# Patient Record
Sex: FEMALE
[De-identification: names, ages, dates, MRNs, and addresses within clinical notes are randomized per-mention and may not be internally consistent; named-entity substitution may affect disease eponyms.]

---

## 2021-11-17 ENCOUNTER — APPOINTMENT (OUTPATIENT)
Dept: ORTHOPEDIC SURGERY | Facility: CLINIC | Age: 61
End: 2021-11-17
Payer: COMMERCIAL

## 2021-11-17 VITALS
HEART RATE: 71 BPM | HEIGHT: 63 IN | DIASTOLIC BLOOD PRESSURE: 83 MMHG | SYSTOLIC BLOOD PRESSURE: 122 MMHG | BODY MASS INDEX: 26.58 KG/M2 | WEIGHT: 150 LBS

## 2021-11-17 DIAGNOSIS — M54.50 LOW BACK PAIN, UNSPECIFIED: ICD-10-CM

## 2021-11-17 DIAGNOSIS — M51.36 OTHER INTERVERTEBRAL DISC DEGENERATION, LUMBAR REGION: ICD-10-CM

## 2021-11-17 DIAGNOSIS — M79.10 MYALGIA, UNSPECIFIED SITE: ICD-10-CM

## 2021-11-17 DIAGNOSIS — M60.9 MYOSITIS, UNSPECIFIED: ICD-10-CM

## 2021-11-17 PROBLEM — Z00.00 ENCOUNTER FOR PREVENTIVE HEALTH EXAMINATION: Status: ACTIVE | Noted: 2021-11-17

## 2021-11-17 PROCEDURE — 20553 NJX 1/MLT TRIGGER POINTS 3/>: CPT

## 2021-11-17 PROCEDURE — 99204 OFFICE O/P NEW MOD 45 MIN: CPT | Mod: 25

## 2021-11-17 PROCEDURE — 72100 X-RAY EXAM L-S SPINE 2/3 VWS: CPT

## 2021-11-17 RX ORDER — DICLOFENAC SODIUM 75 MG/1
75 TABLET, DELAYED RELEASE ORAL
Qty: 30 | Refills: 1 | Status: ACTIVE | COMMUNITY
Start: 2021-11-17 | End: 1900-01-01

## 2021-12-11 ENCOUNTER — APPOINTMENT (OUTPATIENT)
Dept: MRI IMAGING | Facility: CLINIC | Age: 61
End: 2021-12-11

## 2021-12-11 ENCOUNTER — OUTPATIENT (OUTPATIENT)
Dept: OUTPATIENT SERVICES | Facility: HOSPITAL | Age: 61
LOS: 1 days | End: 2021-12-11
Payer: COMMERCIAL

## 2021-12-11 DIAGNOSIS — M51.36 OTHER INTERVERTEBRAL DISC DEGENERATION, LUMBAR REGION: ICD-10-CM

## 2021-12-11 PROCEDURE — 72148 MRI LUMBAR SPINE W/O DYE: CPT

## 2021-12-11 PROCEDURE — 72148 MRI LUMBAR SPINE W/O DYE: CPT | Mod: 26

## 2021-12-15 ENCOUNTER — APPOINTMENT (OUTPATIENT)
Dept: NEUROSURGERY | Facility: CLINIC | Age: 61
End: 2021-12-15
Payer: COMMERCIAL

## 2021-12-15 VITALS
TEMPERATURE: 97.9 F | DIASTOLIC BLOOD PRESSURE: 87 MMHG | WEIGHT: 150 LBS | SYSTOLIC BLOOD PRESSURE: 144 MMHG | OXYGEN SATURATION: 98 % | BODY MASS INDEX: 26.58 KG/M2 | HEART RATE: 74 BPM | HEIGHT: 63 IN

## 2021-12-15 DIAGNOSIS — Z87.39 PERSONAL HISTORY OF OTHER DISEASES OF THE MUSCULOSKELETAL SYSTEM AND CONNECTIVE TISSUE: ICD-10-CM

## 2021-12-15 DIAGNOSIS — M89.9 DISORDER OF BONE, UNSPECIFIED: ICD-10-CM

## 2021-12-15 PROCEDURE — 99205 OFFICE O/P NEW HI 60 MIN: CPT

## 2021-12-20 ENCOUNTER — APPOINTMENT (OUTPATIENT)
Dept: ORTHOPEDIC SURGERY | Facility: CLINIC | Age: 61
End: 2021-12-20

## 2021-12-21 NOTE — ASSESSMENT
[FreeTextEntry1] : December 15, 2021\par \par Jameel Bertrand MD\par [facility]\par [address] [place] [address]\[place] [address]\par \par Re:	Payton Blas\par :	1960\par Dear Dr. Bertrand:\par \par I just saw Payton Blas at your suggestion. She is a 61 year old, pleasant female who has been followed by yourself for years with headache and neck pain, and she has had a number of images showing a right frontal, 2-3 cm, calvarial lesion (rule out hemangioma). This has been noted since 2016. It has been noted on every report that I reviewed in her packet today, 2017, 2018, 2019, and currently the MR done now shows a 3.0 cm, indeterminate lesion in the right frontal calvarium, follow-up is recommended, and a bone scan is recommended. “Tissue sampling should be considered”. She is here for review.\par \par Her past medical history is negative. Her past surgical history is positive for gallbladder resection.\par I thank you for the confidence and courtesy of this referral. She has no allergies. She does not smoke. Family history is negative, and she is completely neurologically intact.\par \par I can feel a slight, slight bump in that area as the expansile nature of this lesion is more towards the brain than towards the scalp. I read all the old reports, and it appears that this lesion is stable. We have ordered a bone scan, and we will see what that shows, but my vote would be to repeat in six to twelve months, rather than take a lesion that has been there for five years and to operate on it. Of course the operation would involve a cranioplasty as well. I would vote for this particular situation to monitor this, and if there is any change whatsoever in the lesion it should be resected.\par \par I hope this meets with your approval, and I thank you for the confidence of the referral, as always.\par \par Sincerely,\par \par \par \par Jose M Keyes M.D., F.A.C.S.\par [facility]\par

## 2021-12-21 NOTE — HISTORY OF PRESENT ILLNESS
[de-identified] : 62 yo right handed female with no significant PMH presents to the office at the request of Dr. Bertrand for a known right skull lesion. She has been following with Dr. Bertrand since 2016 for this calvarial lesion. \par MRI 2016 showed a right calvarial lesion. She had follow up imaging in 2017, 2018 and 2019 which showed stable lesion. \par CT and MRI 2021 shows right calvarial lesion. She denies neurological symptoms including headache. She does not have a bump on forehead. \par Since  the lesion has been there for 5 years without change, we will continue to watch with follow up imaging. \par \par Plan: Manage conservatively\par fu in one year \par bone scan ordered by Dr. Bertrand

## 2021-12-21 NOTE — PHYSICAL EXAM
[General Appearance - Alert] : alert [General Appearance - In No Acute Distress] : in no acute distress [Oriented To Time, Place, And Person] : oriented to person, place, and time [Impaired Insight] : insight and judgment were intact [Affect] : the affect was normal [Person] : oriented to person [Place] : oriented to place [Time] : oriented to time [Short Term Intact] : short term memory intact [Remote Intact] : remote memory intact [Span Intact] : the attention span was normal [Concentration Intact] : normal concentrating ability [Fluency] : fluency intact [Comprehension] : comprehension intact [Current Events] : adequate knowledge of current events [Past History] : adequate knowledge of personal past history [Vocabulary] : adequate range of vocabulary [Cranial Nerves Oculomotor (III)] : extraocular motion intact [Cranial Nerves Trigeminal (V)] : facial sensation intact symmetrically [Cranial Nerves Facial (VII)] : face symmetrical [Cranial Nerves Vestibulocochlear (VIII)] : hearing was intact bilaterally [Cranial Nerves Glossopharyngeal (IX)] : tongue and palate midline [Cranial Nerves Accessory (XI - Cranial And Spinal)] : head turning and shoulder shrug symmetric [Cranial Nerves Hypoglossal (XII)] : there was no tongue deviation with protrusion [Motor Tone] : muscle tone was normal in all four extremities [Motor Strength] : muscle strength was normal in all four extremities [No Muscle Atrophy] : normal bulk in all four extremities [Sensation Tactile Decrease] : light touch was intact [Balance] : balance was intact [Extraocular Movements] : extraocular movements were intact [Neck Appearance] : the appearance of the neck was normal [] : no respiratory distress [Respiration, Rhythm And Depth] : normal respiratory rhythm and effort [Exaggerated Use Of Accessory Muscles For Inspiration] : no accessory muscle use [Heart Rate And Rhythm] : heart rate was normal and rhythm regular [Abnormal Walk] : normal gait [Involuntary Movements] : no involuntary movements were seen [Skin Color & Pigmentation] : normal skin color and pigmentation [Skin Turgor] : normal skin turgor [Limited Balance] : balance was intact [Past-pointing] : there was no past-pointing [Tremor] : no tremor present [Coordination - Dysmetria Impaired Finger-to-Nose Bilateral] : not present

## 2021-12-22 ENCOUNTER — NON-APPOINTMENT (OUTPATIENT)
Age: 61
End: 2021-12-22

## 2021-12-23 ENCOUNTER — APPOINTMENT (OUTPATIENT)
Dept: ORTHOPEDIC SURGERY | Facility: CLINIC | Age: 61
End: 2021-12-23
Payer: COMMERCIAL

## 2021-12-23 DIAGNOSIS — M51.26 OTHER INTERVERTEBRAL DISC DISPLACEMENT, LUMBAR REGION: ICD-10-CM

## 2021-12-23 DIAGNOSIS — M47.817 SPONDYLOSIS W/OUT MYELOPATHY OR RADICULOPATHY, LUMBOSACRAL REGION: ICD-10-CM

## 2021-12-23 PROCEDURE — 99214 OFFICE O/P EST MOD 30 MIN: CPT

## 2021-12-23 RX ORDER — METHYLPREDNISOLONE 4 MG/1
4 TABLET ORAL
Qty: 21 | Refills: 2 | Status: ACTIVE | COMMUNITY
Start: 2021-12-23 | End: 1900-01-01

## 2024-07-29 ENCOUNTER — APPOINTMENT (OUTPATIENT)
Dept: ORTHOPEDIC SURGERY | Facility: CLINIC | Age: 64
End: 2024-07-29
Payer: COMMERCIAL

## 2024-07-29 NOTE — HISTORY OF PRESENT ILLNESS
[de-identified] : 63 year old female presents for  evaluation of  Last seen December 2021. No fever, chills, sweats, nausea/vomiting. No bowel or bladder dysfunction, no recent weight loss or gain. No night pain. This history is in addition to the intake form that I personally reviewed.

## 2024-07-29 NOTE — PHYSICAL EXAM
[Normal] : Gait: normal [Wills's Sign] : negative Wills's sign [Pronator Drift] : negative pronator drift [SLR] : negative straight leg raise [de-identified] : 5 out of 5 motor strength, sensation is intact and symmetrical full range of motion flexion extension and rotation, no palpatory tenderness full range of motion of hips knees shoulders and elbows (all four extremities), no atrophy, negative straight leg raise, no pathological reflexes, no swelling, normal ambulation, no apparent distress skin is intact, no palpable lymph nodes, no upper or lower extremity instability, alert and oriented x3 and normal mood. Normal finger-to nose test.  No upper motor neuron findings.

## 2024-07-29 NOTE — ADDENDUM
[FreeTextEntry1] : This note was written by Matias Skaggs on 07/29/2024 acting as scribe for Dr. Gilberto Figueroa M.D.  I, Gilberto Figueroa MD, have read and attest that all the information, medical decision making and discharge instructions within are true and accurate.

## 2024-07-29 NOTE — DISCUSSION/SUMMARY
[de-identified] :  Discussed all options. All options discussed including rest, medicine, home exercise, acupuncture, Chiropractic care, Physical Therapy, Pain management, and last resort surgery. All questions were answered, all alternatives discussed, and the patient is in complete agreement with the treatment plan which the patient contributed to and discussed with me through the shared decision-making process. Follow-up appointment as instructed. Any issues and the patient will call or come in sooner.

## 2024-08-05 ENCOUNTER — APPOINTMENT (OUTPATIENT)
Dept: ORTHOPEDIC SURGERY | Facility: CLINIC | Age: 64
End: 2024-08-05

## 2024-08-05 PROBLEM — M54.16 LUMBAR RADICULOPATHY: Status: ACTIVE | Noted: 2024-08-05

## 2024-08-05 PROCEDURE — 99214 OFFICE O/P EST MOD 30 MIN: CPT | Mod: 25

## 2024-08-05 PROCEDURE — 72100 X-RAY EXAM L-S SPINE 2/3 VWS: CPT

## 2024-08-05 NOTE — PHYSICAL EXAM
[Wills's Sign] : negative Wills's sign [Pronator Drift] : negative pronator drift [SLR] : negative straight leg raise [de-identified] : 5 out of 5 motor strength, sensation is intact and symmetrical full range of motion flexion extension and rotation, no palpatory tenderness full range of motion of knees shoulders and elbows, no atrophy, negative straight leg raise, no pathological reflexes, no swelling, normal ambulation, no apparent distress skin is intact, no palpable lymph nodes, no upper or lower extremity instability, alert and oriented x3 and normal mood. Normal finger-to nose test.  No upper motor neuron findings. Restricted left hip ROM. Left SI joint pain. [de-identified] : XR AP Lat Lumbar 08/05/2024 -mild degenerative changes, mild left hip arthritis- reviewed with the patient.      FINDINGS:    SPINAL SEGMENTATION: The study assumes 5 non-rib bearing lumbar type vertebral bodies.  SPINAL ALIGNMENT: Lumbar lordosis is maintained.  MARROW: Vertebral body heights are preserved.  DISTAL CORD AND CONUS: Conus terminates at the level of L2.  DISC SPACES: Loss of intervertebral disc height at the L3-L5 levels. Annular fissure at L4-5.  PARASPINAL MUSCLE AND SOFT TISSUES: Within normal limits.  INTRAABDOMINAL/INTRAPELVIC SOFT TISSUES: Within normal limits.    DISC LEVEL EVALUATION:    T12/L1: No central or neural foraminal stenosis.  L1/L2: No central or neural foraminal stenosis.  L2/L3: No central or neural foraminal stenosis.  L3/L4: Left paracentral disc extrusion with cranial migration compresses the transiting left L4 nerve root in the left lateral recess. There is moderate left neural foraminal stenosis.  L4/L5: Annular fissure and disc bulge combine to cause mild left neural foraminal stenosis with mild bilateral lateral recess narrowing.  L5/S1: Disc bulge causing mild bilateral neural foraminal stenosis.    IMPRESSION:  1. Multilevel spondylosis of the lumbar spine, as described above.  2. At L3-4, a left paracentral disc extrusion compresses the transiting left L4 nerve root in the left lateral recess and contributes to moderate left neural foraminal stenosis.  3. At L4-5, annular fissure and disc bulge cause mild left neural foraminal stenosis with mild bilateral lateral recess narrowing.  4. At L5-S1 there is mild bilateral neural foraminal stenosis.    --- End of Report ---        AP and lateral lumbar shows antalgia to the right and degenerative changes with no obvious fracture- reviewed with the patient and .

## 2024-08-05 NOTE — HISTORY OF PRESENT ILLNESS
[Stable] : stable [de-identified] : 63 year old female with history of L3-5 lumbar disc herniation presents for evaluation of left sided lower back pain with radiation down the LLE x several weeks. Last seen December 2021 for left SI joint pain and left lumbar radiculopathy. She states she was doing well up until a few weeks ago. She denies injury. She states that the pain radiates down the LLE anterolaterally to the knee. Denies numbness/tingling. Walking aggravates the pain. Takes tylenol and has mild relief. Avoids NSAIDs due to GERD. Has not tried PT or chiropractic care. Denies MANNY. PMHx: GERD She is retired.  No fever, chills, sweats, nausea/vomiting. No bowel or bladder dysfunction, no recent weight loss or gain. No night pain. This history is in addition to the intake form that I personally reviewed.

## 2024-08-05 NOTE — DISCUSSION/SUMMARY
[de-identified] : Mild lumbar disc degenerative disease. Left lumbar radiculopathy. Mild left hip arthritis. Left sacroiliitis. Discussed all options. MDPx2. Referral for physical therapy. If no better in 2-3 weeks, will obtain MRI. All options discussed including rest, medicine, home exercise, acupuncture, Chiropractic care, Physical Therapy, Pain management, and last resort surgery. All questions were answered, all alternatives discussed, and the patient is in complete agreement with the treatment plan which the patient contributed to and discussed with me through the shared decision-making process. Follow-up appointment as instructed. Any issues and the patient will call or come in sooner.

## 2024-08-05 NOTE — ADDENDUM
[FreeTextEntry1] : This note was written by Matias Skaggs on 08/05/2024 acting as scribe for Dr. Gilberto Figueroa M.D.  I, Gilberto Figueroa MD, have read and attest that all the information, medical decision making and discharge instructions within are true and accurate.

## 2024-10-16 ENCOUNTER — NON-APPOINTMENT (OUTPATIENT)
Age: 64
End: 2024-10-16

## 2024-10-16 ENCOUNTER — APPOINTMENT (OUTPATIENT)
Dept: ORTHOPEDIC SURGERY | Facility: CLINIC | Age: 64
End: 2024-10-16
Payer: COMMERCIAL

## 2024-10-16 VITALS
WEIGHT: 150 LBS | HEART RATE: 74 BPM | DIASTOLIC BLOOD PRESSURE: 75 MMHG | HEIGHT: 63 IN | OXYGEN SATURATION: 99 % | BODY MASS INDEX: 26.58 KG/M2 | SYSTOLIC BLOOD PRESSURE: 114 MMHG

## 2024-10-16 DIAGNOSIS — M54.16 RADICULOPATHY, LUMBAR REGION: ICD-10-CM

## 2024-10-16 PROCEDURE — 99214 OFFICE O/P EST MOD 30 MIN: CPT

## 2024-10-16 RX ORDER — DICLOFENAC SODIUM 75 MG/1
75 TABLET, DELAYED RELEASE ORAL
Qty: 60 | Refills: 0 | Status: ACTIVE | COMMUNITY
Start: 2024-10-16 | End: 1900-01-01

## 2024-10-23 ENCOUNTER — OUTPATIENT (OUTPATIENT)
Dept: OUTPATIENT SERVICES | Facility: HOSPITAL | Age: 64
LOS: 1 days | End: 2024-10-23
Payer: COMMERCIAL

## 2024-10-23 ENCOUNTER — APPOINTMENT (OUTPATIENT)
Dept: MRI IMAGING | Facility: CLINIC | Age: 64
End: 2024-10-23
Payer: COMMERCIAL

## 2024-10-23 DIAGNOSIS — M54.16 RADICULOPATHY, LUMBAR REGION: ICD-10-CM

## 2024-10-23 DIAGNOSIS — M47.817 SPONDYLOSIS WITHOUT MYELOPATHY OR RADICULOPATHY, LUMBOSACRAL REGION: ICD-10-CM

## 2024-10-23 PROCEDURE — 72148 MRI LUMBAR SPINE W/O DYE: CPT | Mod: 26

## 2024-10-23 PROCEDURE — 72148 MRI LUMBAR SPINE W/O DYE: CPT

## 2024-10-30 ENCOUNTER — APPOINTMENT (OUTPATIENT)
Dept: ORTHOPEDIC SURGERY | Facility: CLINIC | Age: 64
End: 2024-10-30
Payer: COMMERCIAL

## 2024-10-30 VITALS
DIASTOLIC BLOOD PRESSURE: 73 MMHG | BODY MASS INDEX: 26.58 KG/M2 | WEIGHT: 150 LBS | HEIGHT: 63 IN | SYSTOLIC BLOOD PRESSURE: 106 MMHG | OXYGEN SATURATION: 98 % | HEART RATE: 62 BPM

## 2024-10-30 DIAGNOSIS — M51.369: ICD-10-CM

## 2024-10-30 PROCEDURE — 99214 OFFICE O/P EST MOD 30 MIN: CPT

## 2024-11-14 ENCOUNTER — RX RENEWAL (OUTPATIENT)
Age: 64
End: 2024-11-14